# Patient Record
Sex: MALE | Race: WHITE | NOT HISPANIC OR LATINO | Employment: OTHER | URBAN - METROPOLITAN AREA
[De-identification: names, ages, dates, MRNs, and addresses within clinical notes are randomized per-mention and may not be internally consistent; named-entity substitution may affect disease eponyms.]

---

## 2023-09-15 PROBLEM — F42.9 OBSESSIVE-COMPULSIVE DISORDER, UNSPECIFIED: Status: ACTIVE | Noted: 2023-09-15

## 2023-09-15 PROBLEM — F98.4 REPETITIVE STEREOTYPED MOVEMENT: Status: ACTIVE | Noted: 2023-09-15

## 2023-09-15 PROBLEM — F70 MILD INTELLECTUAL DISABILITY: Status: ACTIVE | Noted: 2023-09-15

## 2023-09-15 PROBLEM — F90.9 ADHD (ATTENTION DEFICIT HYPERACTIVITY DISORDER): Status: ACTIVE | Noted: 2023-09-15

## 2023-09-15 PROBLEM — F84.0 AUTISM DISORDER (HHS-HCC): Status: ACTIVE | Noted: 2023-09-15

## 2023-09-15 PROBLEM — F41.9 ANXIETY DISORDER: Status: ACTIVE | Noted: 2023-09-15

## 2023-09-15 RX ORDER — ASCORBIC ACID 500 MG
TABLET ORAL
COMMUNITY

## 2023-09-15 RX ORDER — LORAZEPAM 1 MG/1
TABLET ORAL
COMMUNITY
Start: 2018-07-18 | End: 2023-10-20 | Stop reason: SDUPTHER

## 2023-09-15 RX ORDER — CHOLECALCIFEROL (VITAMIN D3) 25 MCG
TABLET ORAL
COMMUNITY

## 2023-09-15 RX ORDER — LURASIDONE HYDROCHLORIDE 20 MG/1
TABLET, FILM COATED ORAL DAILY PRN
COMMUNITY
Start: 2022-10-08 | End: 2023-10-20 | Stop reason: SDUPTHER

## 2023-09-15 RX ORDER — ATOMOXETINE 80 MG/1
1 CAPSULE ORAL EVERY MORNING
COMMUNITY
Start: 2016-01-17 | End: 2023-10-20 | Stop reason: SDUPTHER

## 2023-09-15 RX ORDER — METOPROLOL TARTRATE 50 MG/1
1 TABLET ORAL 2 TIMES DAILY
COMMUNITY
Start: 2017-12-06 | End: 2023-10-20 | Stop reason: SDUPTHER

## 2023-09-15 RX ORDER — LURASIDONE HYDROCHLORIDE 40 MG/1
1 TABLET, FILM COATED ORAL EVERY EVENING
COMMUNITY
Start: 2019-07-30 | End: 2023-10-20 | Stop reason: SDUPTHER

## 2023-09-15 RX ORDER — ATORVASTATIN CALCIUM 20 MG/1
TABLET, FILM COATED ORAL
COMMUNITY
Start: 2016-07-26

## 2023-09-15 RX ORDER — MULTIVITAMIN
TABLET ORAL
COMMUNITY

## 2023-09-15 RX ORDER — FLUTICASONE PROPIONATE 50 MCG
1 SPRAY, SUSPENSION (ML) NASAL DAILY
COMMUNITY
Start: 2015-10-19

## 2023-10-17 ENCOUNTER — TELEMEDICINE (OUTPATIENT)
Dept: BEHAVIORAL HEALTH | Facility: CLINIC | Age: 38
End: 2023-10-17
Payer: COMMERCIAL

## 2023-10-17 DIAGNOSIS — F84.0 AUTISM (HHS-HCC): ICD-10-CM

## 2023-10-17 DIAGNOSIS — F90.2 ATTENTION DEFICIT HYPERACTIVITY DISORDER (ADHD), COMBINED TYPE: ICD-10-CM

## 2023-10-17 DIAGNOSIS — F42.2 MIXED OBSESSIONAL THOUGHTS AND ACTS: ICD-10-CM

## 2023-10-17 DIAGNOSIS — F41.9 ANXIETY: ICD-10-CM

## 2023-10-17 DIAGNOSIS — F98.4 STEREOTYPIC MOVEMENT DISORDER WITH SELF-INJURIOUS BEHAVIOR: ICD-10-CM

## 2023-10-17 PROCEDURE — 99215 OFFICE O/P EST HI 40 MIN: CPT | Performed by: PSYCHIATRY & NEUROLOGY

## 2023-10-17 NOTE — PROGRESS NOTES
"Shravan Haider    :  89      Patient Discussion/Summary    Both patient and family were provided information re medications, including benefits, potential risks, and expected and unexpected side effects.      Chief Complaint    An interactive audio and video telecommunication system which permits real time communications between the patient (at the originating site) and provider (at the distant site) was utilized to provide this telehealth service.    Verbal consent was requested and obtained.       \"He has been doing well overall.\"      DIAGNOSES            ADHD                  Stereotypic Movement disorder              OCD     Autism spectrum disorder              ID, Mild                PRESENT FOR APPOINTMENT          Patient     Parents                SUBJECTIVE      Last visit 2023    No change in medications at that visit.     Vitamin D supplement added in last week or so surrounding low Vit D level    No other changes in medications since that visit.     Current medications include:     Atorvastatin 20 mg per day in evening  Atomoxetine 80 mg each am  Latuda 20 mg in morning and 40 mg around 7 pm  MVI one per day   Metoprolol 100 mg per day (50/50)  Singulair 10 mg per day in am  Trintellix (vortioxetine) 20 mg per day in am  Lorazepam 0.5 mg at bedtime   Vitamin C 500 mg per day  Vitamin D 1000 units per day    Nasocort nasal spray as needed  (taking most days this fall)    Sudafed PE (Phenylephrine) prn (rarely used lately)     He continues to do well since last visit.     Has been doing well at his day program.     May have occasional struggles with peers at day program who are loud.     He often wears noise-deadening headphones at his day program, and sometimes at home (making it harder to hear dogs barking).     No major outbursts or meltdowns in interim.   Parents think Shravan is more content overall.    His mother also noted that Shravan has not been doing much picking at cuticles lately. " Cuticles seem to have healed and look better.     Not much recent tension with his sister.   He remains more accepting of her visiting the family home. Sometimes still struggles when she visits and brings her dog.    However, he has been able to visit and actually stay over at her condo since the last visit.     Sleep has been stable.   Sleeps from 9 pm until 5:30 am. Mostly keeps same schedule on the weekends. Little daytime sleep.  Mother notes he sometimes seems to wake in the night by crying out, as if perhaps having a bad dream.   Goes back to sleep quickly and parents do not have to get up to check on him.    Appetite has been stable; and his weight has increased a couple of pounds since last visit.      He completed his adaptive football camp; and will soon start basketball.        REVIEW OF SYMPTOMS      No interim medical visits except PCP Dr. Cota.   No change in medications.     He was previously followed by allergist Dr. Self; but no recent visits. In the past, noted he was allergic to grasses, dust mites, and mold. Sometimes has sneezing and watery eyes, non-productive cough. Over last several years, he is treated with Singulair year round, and NasoCort nasal spray seasonally. Has struggled with allergies this summer and fall.      Family is still looking for new allergist.       No other visits to ED or urgent care in interim    No interim falls.     No recent issues with bowel function. Has very large BMs, usually daily or every other day. No recent apparent constipation. No reports of nausea or vomiting or loose stools.     No other acute medical issues since last visit.     PAIN      No recent c/o headache     No other reports of pain                MEDICAL PROBLEMS     His PCP is Dr. Cota at [facility]. One interim visit              SIDE EFFECTS           None new, and weight slightly increased since last visit.       CHANGE IN TREATMENT          None at last visit.      COMPLIANCE           Compliance good        MENTAL STATUS EVALUATION         Per video contact    alert     ambulatory     cooperative     neat, well groomed                BEHAVIOR            Less gaze avoidance with zoom.     Some spontaneous speech     Reciprocal interaction when engaged.              MEMORY            poor remote     Good immediate recall             SENSORY            Appears normal                COMMUNICATION           Conversational     Phrases only              Single words mostly     Speech dysarthria but understandable.       AFFECT            Less restricted             MOOD            Seems euthymic, no apparent sadness, less overt anxiety                THOUGHT PROCESS          [place]     Perseverative                THOUGHT CONTENT          obsessive                PSYCHOTIC SYMPTOMS          none seen               ORIENTATION           x3, knew day of week and month       CONCENTRATION           normal       CALCULATION           addition only       FUND OF KNOWLEDGE          mild disability        JUDGMENT            posed situations               GAIT             appears normal                EPS             none seen               AIMS             Negative, no abnormal movements seen or described      LABS REVIEWED    October 2023:    Glucose 102; Cr 1.01; ; HgbA1C 5.3% (normal).  TSH normal.  Vit D low at 21.5.      Feb 2021:    CBC, CMP, TSH HgbA1C all normal (5.3%). , rest of lipids normal    August 2019:    CMP normal, Mg normal. TG is slightly elevated, and HDL/LDL ratio slightly reversed, but grossly normal.     March 2019:    CBC, CMP, TSH, Mg all normal    July 2018:    Protein slightly low, rest of hepatic panel normal. TSH normal. CBC normal. Lipid panel is near normal; with slightly low HDL and slightly elevated LDL. HgbA1C is normal.     August 2017:    Normal CBC, CMP, lipid panel, and Mg    December 2016:    CBC, CMP and ESR all normal.      July  2016:    CBC normal. CMP normal. Elevated lipid panel (TG and cholesterol).                EKG             None in interim    Feb 2023:    NSR 93 bpm; QTc 421 msec. Normal EKG    Feb 2022    Sinus tach, no QT prolongation     January 2020:    NSR, rate 99 bpm, QTc 437 msec.     July 2018: (following addition of lurasidone)     NSR rate 94 bpm, QTc 446 msec.    November 2017:    Sinus tach, rate 99 bpm, one PVC, QTc 421 msec.     August 2017:    Sinus tachycardia, rate 104 bpm, vent bigeminy, QTc 463 msec     January 2017 (after starting ziprasidone):    NSR rate 93 bpm. QTc 418 msec. Q waves inferior leads read as normal variant.     July 2016:    Sinus tach at 107 bpm. QTc 454 msec, otherwise normal.      April 2015:    Sinus tach 114 bpm, QTc 328 msec      March 2014:    NSR rate 92 bpm, QTc 441 msec.     Feb 2013    Normal NSR, rate 95 bpm, QTc 438 msec       ASSESSMENT     Stable since last visit, and has been doing well both at home and at his day program.  Remains less anxious, and less obsessive worry about next events, dogs barking, etc.    Sleep and appetite seem stable.      Recent labs normal except low Vit D level.        Plan no change in medications this visit.     Parents can use Latuda or lorazepam on a prn basis for anxiety-provoking situations.     Would not give Latuda and lorazepam routinely at same time for this purpose     Ongoing discussions of transition to new psychiatrist in early 2024    PLAN             problems treated     f/u requested to prevent relapse     medications renewed/re-ordered         1. No change in routine psychotropic medications this visit.   2 E-refills today will try for 90 day supplies  3. No labs or EKG this visit.      TREATMENT TYPE           Video-based visit.     67502, counseling and coordination of care 50 minutes, with > 50% c/c with family; addressing s/s of illness; risks/benefits and side effects of medications, and behavioral approaches to illness.          F/UP INTERNAL           3 months and as needed        Family History  Mother    · Family history of Anxiety   · Family history of OCD (obsessive compulsive disorder) (V17.0) (Z81.8)  Maternal Aunt    · Family history of OCD (obsessive compulsive disorder) (V17.0) (Z81.8)    Social History  Problems    · Never a smoker    Allergies  Medication    · No Known Drug Allergies   Recorded By: Elisabet Escamilla; 1/6/2015 1:56:23 PM

## 2023-10-20 RX ORDER — ATOMOXETINE 80 MG/1
80 CAPSULE ORAL EVERY MORNING
Qty: 30 CAPSULE | Refills: 5 | Status: SHIPPED | OUTPATIENT
Start: 2023-10-20 | End: 2024-03-25 | Stop reason: SDUPTHER

## 2023-10-20 RX ORDER — METOPROLOL TARTRATE 50 MG/1
50 TABLET ORAL 2 TIMES DAILY
Qty: 60 TABLET | Refills: 5 | Status: SHIPPED | OUTPATIENT
Start: 2023-10-20 | End: 2024-03-25 | Stop reason: SDUPTHER

## 2023-10-20 RX ORDER — LURASIDONE HYDROCHLORIDE 40 MG/1
40 TABLET, FILM COATED ORAL EVERY EVENING
Qty: 30 TABLET | Refills: 5 | Status: SHIPPED | OUTPATIENT
Start: 2023-10-20 | End: 2024-03-25 | Stop reason: SDUPTHER

## 2023-10-20 RX ORDER — LORAZEPAM 1 MG/1
TABLET ORAL
Qty: 30 TABLET | Refills: 5 | Status: SHIPPED | OUTPATIENT
Start: 2023-10-20 | End: 2024-03-25 | Stop reason: SDUPTHER

## 2023-10-20 RX ORDER — LURASIDONE HYDROCHLORIDE 20 MG/1
20 TABLET, FILM COATED ORAL DAILY PRN
Qty: 30 TABLET | Refills: 5 | Status: SHIPPED | OUTPATIENT
Start: 2023-10-20 | End: 2024-03-25 | Stop reason: SDUPTHER

## 2024-02-27 ENCOUNTER — APPOINTMENT (OUTPATIENT)
Dept: BEHAVIORAL HEALTH | Facility: CLINIC | Age: 39
End: 2024-02-27
Payer: MEDICAID

## 2024-03-25 ENCOUNTER — OFFICE VISIT (OUTPATIENT)
Dept: BEHAVIORAL HEALTH | Facility: CLINIC | Age: 39
End: 2024-03-25
Payer: MEDICAID

## 2024-03-25 DIAGNOSIS — F90.2 ATTENTION DEFICIT HYPERACTIVITY DISORDER (ADHD), COMBINED TYPE: ICD-10-CM

## 2024-03-25 DIAGNOSIS — F41.9 ANXIETY: ICD-10-CM

## 2024-03-25 DIAGNOSIS — F42.2 MIXED OBSESSIONAL THOUGHTS AND ACTS: ICD-10-CM

## 2024-03-25 DIAGNOSIS — F98.4 STEREOTYPIC MOVEMENT DISORDER WITH SELF-INJURIOUS BEHAVIOR: ICD-10-CM

## 2024-03-25 PROCEDURE — 99215 OFFICE O/P EST HI 40 MIN: CPT | Performed by: PSYCHIATRY & NEUROLOGY

## 2024-03-25 RX ORDER — LORAZEPAM 1 MG/1
TABLET ORAL
Qty: 30 TABLET | Refills: 5 | Status: SHIPPED | OUTPATIENT
Start: 2024-03-25

## 2024-03-25 RX ORDER — ATOMOXETINE 80 MG/1
80 CAPSULE ORAL EVERY MORNING
Qty: 90 CAPSULE | Refills: 1 | Status: SHIPPED | OUTPATIENT
Start: 2024-03-25

## 2024-03-25 RX ORDER — METOPROLOL TARTRATE 50 MG/1
50 TABLET ORAL 2 TIMES DAILY
Qty: 180 TABLET | Refills: 1 | Status: SHIPPED | OUTPATIENT
Start: 2024-03-25

## 2024-03-25 RX ORDER — LURASIDONE HYDROCHLORIDE 20 MG/1
20 TABLET, FILM COATED ORAL DAILY PRN
Qty: 90 TABLET | Refills: 1 | Status: SHIPPED | OUTPATIENT
Start: 2024-03-25

## 2024-03-25 RX ORDER — LURASIDONE HYDROCHLORIDE 40 MG/1
40 TABLET, FILM COATED ORAL EVERY EVENING
Qty: 90 TABLET | Refills: 1 | Status: SHIPPED | OUTPATIENT
Start: 2024-03-25

## 2024-03-25 NOTE — PROGRESS NOTES
"Shravan Haider    :  89      Patient Discussion/Summary    Both patient and family were provided information re medications, including benefits, potential risks, and expected and unexpected side effects.      Chief Complaint    An interactive audio and video telecommunication system which permits real time communications between the patient (at the originating site) and provider (at the distant site) was utilized to provide this telehealth service.    Verbal consent was requested and obtained.       \"[place]\"      DIAGNOSES            ADHD                  Stereotypic Movement disorder              OCD     Autism spectrum disorder              ID, Mild                PRESENT FOR APPOINTMENT          Patient     Parents                SUBJECTIVE      Last visit 2023    No change in medications at that visit.     No other changes in medications since that visit.     Current medications include:     Atorvastatin 20 mg per day in evening  Atomoxetine 80 mg each am  Latuda 20 mg in morning and 40 mg around 7 pm  MVI one per day   Metoprolol 100 mg per day (50/50)  Singulair 10 mg per day in am  Trintellix (vortioxetine) 20 mg per day in am  Lorazepam 0.5 mg at bedtime   Vitamin C 500 mg per day  Vitamin D 1000 units per day    Nasocort nasal spray as needed  (taking most days this fall)    Sudafed PE (Phenylephrine) prn (rarely used lately)     He continues to do very well since last visit.     Did well on family visit to [place], did well on airplane, in hotel, at [facility].    Has been doing well at his day program.     His parents note that he has done much better lately with transitions, and has been able to tolerate situations which would have produced behavioral outbursts in the past.   They have noted that if they prepare Shravan by telling him what to expect, he does much better.       His transportation to and from his day program is also going better, in that two of his peers who were " previously upsetting for Shravan are no longer on his van, making these trips more comfortable.         He often wears noise-deadening headphones at his day program, and sometimes at home (making it harder to hear dogs barking).     His mother also noted that Shravan has not been doing much picking at cuticles lately. Cuticles seem to have healed and look better.     Not much recent tension with his sister.   He remains more accepting of her visiting the family home. Doing better when she visits and brings her dog.    Has also been able to visit and actually stay over at her condo since the last visit.     Sleep has been stable; and seems adequate.     Sleeps from 9 pm until 5:30 am. Mostly keeps same schedule on the weekends. Little daytime sleep.      Appetite has been stable; and his weight has decreased several pounds over the last year.   Current weight 205, was 209 one year ago.         Adaptive basketball has been delayed, but will hopefully begin soon.         REVIEW OF SYMPTOMS      One interim medical visit with PCP Dr. Cota.   No change in medications.  No labs     He was previously followed by allergist Dr. Self; but no longer seeing this allergist.  Continues to have many allergy symptoms.   Has sneezing and watery eyes, non-productive cough. Over last several years, he is treated with Singulair year round, and NasoCort nasal spray seasonally; now prescribed by his PCP Dr. Cota.  Usually struggles with allergies spring, summer and fall.      Family is still looking for new allergist.       Had recent dental visit, no cavities, tolerated well    No other visits to ED or urgent care in interim    No interim falls.     No recent issues with bowel function. Has very large BMs, usually daily or every other day. No recent apparent constipation. No reports of nausea or vomiting or loose stools.     No other acute medical issues since last visit.     PAIN      No recent c/o headache     No other reports of  pain                MEDICAL PROBLEMS     His PCP is Dr. Cota at [facility]. One interim visit              SIDE EFFECTS           None new, and weight slightly decreased over lst one year.         CHANGE IN TREATMENT          None at last visit.     COMPLIANCE           Compliance good        MENTAL STATUS EVALUATION         Per video contact    alert     ambulatory     cooperative     neat, well groomed                BEHAVIOR            Less gaze avoidance over time.     Some spontaneous speech     Reciprocal interaction when engaged.              MEMORY            poor remote     Good immediate recall             SENSORY            Appears normal                COMMUNICATION           Conversational     Phrases only              Single words mostly     Speech dysarthria but very understandable.       AFFECT            Less restricted             MOOD            Seems euthymic, no apparent sadness, less overt anxiety                THOUGHT PROCESS          [place]     Perseverative                THOUGHT CONTENT          obsessive                PSYCHOTIC SYMPTOMS          none seen               ORIENTATION           x3, knew day of week and month       CONCENTRATION           normal       CALCULATION           addition only       FUND OF KNOWLEDGE          mild disability        JUDGMENT            posed situations               GAIT             appears normal                EPS             none seen               AIMS             Negative, no abnormal movements seen or described      LABS REVIEWED    None in interim    October 2023:    Glucose 102; Cr 1.01; ; HgbA1C 5.3% (normal).  TSH normal.  Vit D low at 21.5.  (taking supplement since)    Feb 2021:    CBC, CMP, TSH HgbA1C all normal (5.3%). , rest of lipids normal    August 2019:    CMP normal, Mg normal. TG is slightly elevated, and HDL/LDL ratio slightly reversed, but grossly normal.     March 2019:    CBC, CMP, TSH, Mg all normal    July  2018:    Protein slightly low, rest of hepatic panel normal. TSH normal. CBC normal. Lipid panel is near normal; with slightly low HDL and slightly elevated LDL. HgbA1C is normal.     August 2017:    Normal CBC, CMP, lipid panel, and Mg    December 2016:    CBC, CMP and ESR all normal.      July 2016:    CBC normal. CMP normal. Elevated lipid panel (TG and cholesterol).                EKG             None in interim    Feb 2023:    NSR 93 bpm; QTc 421 msec. Normal EKG    Feb 2022    Sinus tach, no QT prolongation     January 2020:    NSR, rate 99 bpm, QTc 437 msec.     July 2018: (following addition of lurasidone)     NSR rate 94 bpm, QTc 446 msec.    November 2017:    Sinus tach, rate 99 bpm, one PVC, QTc 421 msec.     August 2017:    Sinus tachycardia, rate 104 bpm, vent bigeminy, QTc 463 msec     January 2017 (after starting ziprasidone):    NSR rate 93 bpm. QTc 418 msec. Q waves inferior leads read as normal variant.     July 2016:    Sinus tach at 107 bpm. QTc 454 msec, otherwise normal.      April 2015:    Sinus tach 114 bpm, QTc 328 msec      March 2014:    NSR rate 92 bpm, QTc 441 msec.     Feb 2013    Normal NSR, rate 95 bpm, QTc 438 msec       ASSESSMENT     Stable or improved since last visit.   He is reported as doing well both at home and at his day program.  Remains less anxious, and has less severe  obsessive worry about next events, dogs barking, etc.        Sleep and appetite seem stable; weight decreased slightly over last one year.      No interim labs or EKG.          Plan no change in medications this visit.     Parents can use Latuda or lorazepam on a prn basis for anxiety-provoking situations.     Would not give Latuda and lorazepam routinely at same time for this purpose    Continued discussion of transition to new psychiatrist for ongoing care.  His PCP has contacted Dr. Marga Deleon at [facility] to facilitate.   Will renew medications until he can see new clinician     PLAN             problems  treated     f/u requested to prevent relapse     medications renewed/re-ordered         1. No change in routine psychotropic medications this visit.   2 E-refills today will try for 90 day supplies  3. No labs or EKG this visit.    4.  Transition to Dr. Deleon at [facility]    TREATMENT TYPE           Video-based visit.     52640, counseling and coordination of care 50 minutes, with > 50% c/c with family; addressing s/s of illness; risks/benefits and side effects of medications, and behavioral approaches to illness.         F/UP INTERNAL           Family History  Mother    · Family history of Anxiety   · Family history of OCD (obsessive compulsive disorder) (V17.0) (Z81.8)  Maternal Aunt    · Family history of OCD (obsessive compulsive disorder) (V17.0) (Z81.8)    Social History     · Never a smoker    Allergies  Medication    · No Known Drug Allergies

## 2024-11-04 ENCOUNTER — APPOINTMENT (OUTPATIENT)
Dept: ALLERGY | Facility: CLINIC | Age: 39
End: 2024-11-04
Payer: COMMERCIAL

## 2024-12-17 DIAGNOSIS — F41.9 ANXIETY: ICD-10-CM

## 2024-12-17 DIAGNOSIS — F98.4 STEREOTYPIC MOVEMENT DISORDER WITH SELF-INJURIOUS BEHAVIOR: ICD-10-CM

## 2024-12-17 RX ORDER — LURASIDONE HYDROCHLORIDE 20 MG/1
TABLET, FILM COATED ORAL
Qty: 90 TABLET | Refills: 1 | OUTPATIENT
Start: 2024-12-17